# Patient Record
Sex: MALE | Race: OTHER
[De-identification: names, ages, dates, MRNs, and addresses within clinical notes are randomized per-mention and may not be internally consistent; named-entity substitution may affect disease eponyms.]

---

## 2021-03-13 ENCOUNTER — HOSPITAL ENCOUNTER (EMERGENCY)
Dept: HOSPITAL 8 - ED | Age: 41
Discharge: HOME | End: 2021-03-13
Payer: SELF-PAY

## 2021-03-13 VITALS — DIASTOLIC BLOOD PRESSURE: 71 MMHG | SYSTOLIC BLOOD PRESSURE: 129 MMHG

## 2021-03-13 VITALS — WEIGHT: 187.39 LBS | BODY MASS INDEX: 26.83 KG/M2 | HEIGHT: 70 IN
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
## 2021-03-13 DIAGNOSIS — S80.01XA: Primary | ICD-10-CM

## 2021-03-13 DIAGNOSIS — S90.01XA: ICD-10-CM

## 2021-03-13 DIAGNOSIS — S90.31XA: ICD-10-CM
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
## 2021-03-13 PROCEDURE — 99284 EMERGENCY DEPT VISIT MOD MDM: CPT

## 2021-03-13 PROCEDURE — 73564 X-RAY EXAM KNEE 4 OR MORE: CPT

## 2021-03-13 PROCEDURE — 73630 X-RAY EXAM OF FOOT: CPT

## 2021-03-13 PROCEDURE — 96372 THER/PROPH/DIAG INJ SC/IM: CPT

## 2021-03-13 PROCEDURE — 73590 X-RAY EXAM OF LOWER LEG: CPT
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
## 2025-07-06 NOTE — NUR
[id number]     Chief complaint  Patient is a 72y old  Male who presents with a chief complaint of CHF (03 Jul 2025 20:19)         Labs and Fingersticks  CAPILLARY BLOOD GLUCOSE      POCT Blood Glucose.: 152 mg/dL (06 Jul 2025 11:17)  POCT Blood Glucose.: 132 mg/dL (06 Jul 2025 07:48)  POCT Blood Glucose.: 132 mg/dL (06 Jul 2025 06:19)  POCT Blood Glucose.: 160 mg/dL (05 Jul 2025 21:33)  POCT Blood Glucose.: 111 mg/dL (05 Jul 2025 16:23)      Anion Gap: 17 (07-06 @ 06:05)  Anion Gap: 16 (07-05 @ 06:28)      Calcium: 11.0 *H* (07-06 @ 06:05)  Calcium: 10.0 (07-05 @ 06:28)  Intact PTH: 345 *H* (07-05 @ 06:28)  Albumin: 2.8 *L* (07-05 @ 06:28)    Alanine Aminotransferase (ALT/SGPT): 38 (07-05 @ 06:28)  Alkaline Phosphatase: 156 *H* (07-05 @ 06:28)  Aspartate Aminotransferase (AST/SGOT): 37 (07-05 @ 06:28)        07-06    132[L]  |  93[L]  |  69[H]  ----------------------------<  117[H]  4.8   |  22  |  7.85[H]    Ca    11.0[H]      06 Jul 2025 06:05  Phos  4.2     07-06  Mg     2.7     07-06    TPro  6.0  /  Alb  2.8[L]  /  TBili  0.3  /  DBili  x   /  AST  37  /  ALT  38  /  AlkPhos  156[H]  07-05                        8.3    12.38 )-----------( 251      ( 06 Jul 2025 06:08 )             27.2     Medications  MEDICATIONS  (STANDING):  aMIOdarone    Tablet 200 milliGRAM(s) Oral daily  aspirin enteric coated 81 milliGRAM(s) Oral daily  atorvastatin 80 milliGRAM(s) Oral at bedtime  bisacodyl Suppository 10 milliGRAM(s) Rectal once  chlorhexidine 4% Liquid 1 Application(s) Topical daily  dextrose 5%. 1000 milliLiter(s) (100 mL/Hr) IV Continuous <Continuous>  dextrose 50% Injectable 50 milliLiter(s) IV Push every 15 minutes  dextrose 50% Injectable 25 milliLiter(s) IV Push every 15 minutes  glucagon  Injectable 1 milliGRAM(s) IntraMuscular once  heparin   Injectable 5000 Unit(s) SubCutaneous every 8 hours  insulin glargine Injectable (LANTUS) 30 Unit(s) SubCutaneous at bedtime  insulin lispro (ADMELOG) corrective regimen sliding scale   SubCutaneous three times a day before meals  insulin lispro (ADMELOG) corrective regimen sliding scale   SubCutaneous at bedtime  insulin lispro Injectable (ADMELOG) 12 Unit(s) SubCutaneous three times a day before meals  ipratropium    for Nebulization 500 MICROGram(s) Nebulizer every 6 hours  melatonin 5 milliGRAM(s) Oral at bedtime  metoprolol tartrate 12.5 milliGRAM(s) Oral two times a day  multivitamin/minerals 1 Tablet(s) Oral daily  pantoprazole    Tablet 40 milliGRAM(s) Oral before breakfast  polyethylene glycol 3350 17 Gram(s) Oral every 12 hours  senna 2 Tablet(s) Oral at bedtime  sodium chloride 0.9% lock flush 3 milliLiter(s) IV Push every 8 hours  sodium chloride 0.9%. 1000 milliLiter(s) (10 mL/Hr) IV Continuous <Continuous>  traZODone 50 milliGRAM(s) Oral at bedtime      Physical Exam  General: Patient comfortable in bed   Vital Signs Last 12 Hrs  T(F): 97.5 (07-06-25 @ 04:45), Max: 97.5 (07-06-25 @ 04:45)  HR: 87 (07-06-25 @ 04:45) (87 - 87)  BP: 118/66 (07-06-25 @ 04:45) (118/66 - 121/63)  BP(mean): 83 (07-06-25 @ 04:45) (83 - 83)  RR: 18 (07-06-25 @ 04:45) (18 - 18)  SpO2: 95% (07-06-25 @ 04:45) (95% - 95%)    CVS: S1S2   Respiratory: No wheezing, no crepitations  GI: Abdomen soft, bowel sounds positive  Musculoskeletal:  moves all extremities         Chief complaint  Patient is a 72y old  Male who presents with a chief complaint of CHF (03 Jul 2025 20:19)     Labs and Fingersticks  CAPILLARY BLOOD GLUCOSE      POCT Blood Glucose.: 152 mg/dL (06 Jul 2025 11:17)  POCT Blood Glucose.: 132 mg/dL (06 Jul 2025 07:48)  POCT Blood Glucose.: 132 mg/dL (06 Jul 2025 06:19)  POCT Blood Glucose.: 160 mg/dL (05 Jul 2025 21:33)  POCT Blood Glucose.: 111 mg/dL (05 Jul 2025 16:23)      Anion Gap: 17 (07-06 @ 06:05)  Anion Gap: 16 (07-05 @ 06:28)      Calcium: 11.0 *H* (07-06 @ 06:05)  Calcium: 10.0 (07-05 @ 06:28)  Intact PTH: 345 *H* (07-05 @ 06:28)  Albumin: 2.8 *L* (07-05 @ 06:28)    Alanine Aminotransferase (ALT/SGPT): 38 (07-05 @ 06:28)  Alkaline Phosphatase: 156 *H* (07-05 @ 06:28)  Aspartate Aminotransferase (AST/SGOT): 37 (07-05 @ 06:28)        07-06    132[L]  |  93[L]  |  69[H]  ----------------------------<  117[H]  4.8   |  22  |  7.85[H]    Ca    11.0[H]      06 Jul 2025 06:05  Phos  4.2     07-06  Mg     2.7     07-06    TPro  6.0  /  Alb  2.8[L]  /  TBili  0.3  /  DBili  x   /  AST  37  /  ALT  38  /  AlkPhos  156[H]  07-05                        8.3    12.38 )-----------( 251      ( 06 Jul 2025 06:08 )             27.2     Medications  MEDICATIONS  (STANDING):  aMIOdarone    Tablet 200 milliGRAM(s) Oral daily  aspirin enteric coated 81 milliGRAM(s) Oral daily  atorvastatin 80 milliGRAM(s) Oral at bedtime  bisacodyl Suppository 10 milliGRAM(s) Rectal once  chlorhexidine 4% Liquid 1 Application(s) Topical daily  dextrose 5%. 1000 milliLiter(s) (100 mL/Hr) IV Continuous <Continuous>  dextrose 50% Injectable 50 milliLiter(s) IV Push every 15 minutes  dextrose 50% Injectable 25 milliLiter(s) IV Push every 15 minutes  glucagon  Injectable 1 milliGRAM(s) IntraMuscular once  heparin   Injectable 5000 Unit(s) SubCutaneous every 8 hours  insulin glargine Injectable (LANTUS) 30 Unit(s) SubCutaneous at bedtime  insulin lispro (ADMELOG) corrective regimen sliding scale   SubCutaneous three times a day before meals  insulin lispro (ADMELOG) corrective regimen sliding scale   SubCutaneous at bedtime  insulin lispro Injectable (ADMELOG) 12 Unit(s) SubCutaneous three times a day before meals  ipratropium    for Nebulization 500 MICROGram(s) Nebulizer every 6 hours  melatonin 5 milliGRAM(s) Oral at bedtime  metoprolol tartrate 12.5 milliGRAM(s) Oral two times a day  multivitamin/minerals 1 Tablet(s) Oral daily  pantoprazole    Tablet 40 milliGRAM(s) Oral before breakfast  polyethylene glycol 3350 17 Gram(s) Oral every 12 hours  senna 2 Tablet(s) Oral at bedtime  sodium chloride 0.9% lock flush 3 milliLiter(s) IV Push every 8 hours  sodium chloride 0.9%. 1000 milliLiter(s) (10 mL/Hr) IV Continuous <Continuous>  traZODone 50 milliGRAM(s) Oral at bedtime      Physical Exam  General: Patient comfortable in bed   Vital Signs Last 12 Hrs  T(F): 97.5 (07-06-25 @ 04:45), Max: 97.5 (07-06-25 @ 04:45)  HR: 87 (07-06-25 @ 04:45) (87 - 87)  BP: 118/66 (07-06-25 @ 04:45) (118/66 - 121/63)  BP(mean): 83 (07-06-25 @ 04:45) (83 - 83)  RR: 18 (07-06-25 @ 04:45) (18 - 18)  SpO2: 95% (07-06-25 @ 04:45) (95% - 95%)    CVS: S1S2   Respiratory: No wheezing, no crepitations  GI: Abdomen soft, bowel sounds positive  Musculoskeletal:  moves all extremities